# Patient Record
Sex: FEMALE | Race: WHITE
[De-identification: names, ages, dates, MRNs, and addresses within clinical notes are randomized per-mention and may not be internally consistent; named-entity substitution may affect disease eponyms.]

---

## 2017-07-31 ENCOUNTER — HOSPITAL ENCOUNTER (EMERGENCY)
Dept: HOSPITAL 42 - ED | Age: 47
LOS: 1 days | Discharge: HOME | End: 2017-08-01
Payer: MEDICAID

## 2017-07-31 VITALS — TEMPERATURE: 98.6 F | OXYGEN SATURATION: 100 %

## 2017-07-31 VITALS — BODY MASS INDEX: 25.9 KG/M2

## 2017-07-31 DIAGNOSIS — K29.70: ICD-10-CM

## 2017-07-31 DIAGNOSIS — R11.2: ICD-10-CM

## 2017-07-31 DIAGNOSIS — S29.9XXA: Primary | ICD-10-CM
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
## 2017-07-31 DIAGNOSIS — M54.5: ICD-10-CM

## 2017-07-31 PROCEDURE — 80053 COMPREHEN METABOLIC PANEL: CPT

## 2017-07-31 PROCEDURE — 99284 EMERGENCY DEPT VISIT MOD MDM: CPT

## 2017-07-31 PROCEDURE — 96375 TX/PRO/DX INJ NEW DRUG ADDON: CPT

## 2017-07-31 PROCEDURE — 74177 CT ABD & PELVIS W/CONTRAST: CPT

## 2017-07-31 PROCEDURE — 85025 COMPLETE CBC W/AUTO DIFF WBC: CPT

## 2017-07-31 PROCEDURE — 96374 THER/PROPH/DIAG INJ IV PUSH: CPT

## 2017-07-31 PROCEDURE — 72110 X-RAY EXAM L-2 SPINE 4/>VWS: CPT

## 2017-07-31 PROCEDURE — 71260 CT THORAX DX C+: CPT

## 2017-07-31 PROCEDURE — 81001 URINALYSIS AUTO W/SCOPE: CPT

## 2017-07-31 PROCEDURE — 96361 HYDRATE IV INFUSION ADD-ON: CPT

## 2017-07-31 PROCEDURE — 83690 ASSAY OF LIPASE: CPT

## 2017-08-01 VITALS — HEART RATE: 70 BPM | DIASTOLIC BLOOD PRESSURE: 70 MMHG | RESPIRATION RATE: 17 BRPM | SYSTOLIC BLOOD PRESSURE: 137 MMHG

## 2017-08-01 LAB
ADD MANUAL DIFF?: NO
ALBUMIN/GLOB SERPL: 1.3 {RATIO} (ref 1.1–1.8)
ALP SERPL-CCNC: 50 U/L (ref 38–133)
ALT SERPL-CCNC: 29 U/L (ref 7–56)
APPEARANCE UR: CLEAR
AST SERPL-CCNC: 21 U/L (ref 15–39)
BASOPHILS # BLD AUTO: 0.01 K/MM3 (ref 0–2)
BASOPHILS NFR BLD: 0.2 % (ref 0–3)
BILIRUB SERPL-MCNC: 0.2 MG/DL (ref 0.2–1.3)
BILIRUB UR-MCNC: NEGATIVE MG/DL
BUN SERPL-MCNC: 11 MG/DL (ref 7–21)
CALCIUM SERPL-MCNC: 9 MG/DL (ref 8.4–10.5)
CHLORIDE SERPL-SCNC: 103 MMOL/L (ref 95–110)
CO2 SERPL-SCNC: 27 MMOL/L (ref 21–33)
COLOR UR: YELLOW
EOSINOPHIL # BLD: 0.2 10*3/UL (ref 0–0.7)
EOSINOPHIL NFR BLD: 2.6 % (ref 1.5–5)
EPI CELLS #/AREA URNS HPF: (no result) /HPF (ref 0–5)
ERYTHROCYTE [DISTWIDTH] IN BLOOD BY AUTOMATED COUNT: 17.4 % (ref 11.5–14.5)
GLOBULIN SER-MCNC: 3 GM/DL
GLUCOSE SERPL-MCNC: 106 MG/DL (ref 70–110)
GLUCOSE UR STRIP-MCNC: NEGATIVE MG/DL
GRANULOCYTES # BLD: 2.45 10*3/UL (ref 1.4–6.5)
GRANULOCYTES NFR BLD: 42.1 % (ref 50–68)
HCT VFR BLD CALC: 34.5 % (ref 36–48)
KETONES UR STRIP-MCNC: NEGATIVE MG/DL
LEUKOCYTE ESTERASE UR-ACNC: NEGATIVE LEU/UL
LIPASE SERPL-CCNC: 106 U/L (ref 23–300)
LYMPHOCYTES # BLD: 2.8 10*3/UL (ref 1.2–3.4)
LYMPHOCYTES NFR BLD AUTO: 47.7 % (ref 22–35)
MCH RBC QN AUTO: 22.4 PG (ref 25–35)
MCHC RBC AUTO-ENTMCNC: 31.9 G/DL (ref 31–37)
MCV RBC AUTO: 70.4 FL (ref 80–105)
MONOCYTES # BLD AUTO: 0.4 10*3/UL (ref 0.1–0.6)
MONOCYTES NFR BLD: 7.4 % (ref 1–6)
PH UR STRIP: 6.5 [PH] (ref 4.7–8)
PLATELET # BLD: 292 10^3/UL (ref 120–450)
POTASSIUM SERPL-SCNC: 4.1 MMOL/L (ref 3.6–5)
PROT SERPL-MCNC: 7.1 G/DL (ref 5.8–8.3)
PROT UR STRIP-MCNC: NEGATIVE MG/DL
RBC # UR STRIP: (no result) /UL
RBC #/AREA URNS HPF: (no result) /HPF (ref 0–2)
SODIUM SERPL-SCNC: 140 MMOL/L (ref 132–148)
SP GR UR STRIP: 1.02 (ref 1–1.03)
UROBILINOGEN UR STRIP-ACNC: 0.2 E.U./DL
WBC # BLD AUTO: 5.8 10^3/UL (ref 4.5–11)
WBC #/AREA URNS HPF: (no result) /HPF (ref 0–6)

## 2017-08-01 NOTE — CT
EXAM:

  CT Chest With Intravenous Contrast



CLINICAL HISTORY:

  46 years old, female; Pain; Abdominal pain; Generalized; Chest pain; 

Additional info: Abdominal pain/diarrhea/vomiting



TECHNIQUE:

  Axial computed tomography images of the chest with intravenous contrast.  

This CT exam was performed using one or more of the following dose reduction 

techniques:  automated exposure control, adjustment of the mA and/or kV 

according to patient size, and/or use of iterative reconstruction technique.

  Coronal and sagittal reformatted images were created and reviewed.



CONTRAST:

  140 mL of OMNI 350 administered intravenously.



COMPARISON:

  No relevant prior studies available.



FINDINGS:

  Limitations:  Motion artifact - mild.

  Lungs:  Minimal atelectasis.  No consolidation.

  Pleural space:  No pneumothorax.  No significant effusion.

  Heart:  No cardiomegaly.  No significant pericardial effusion.

  Bones/joints:  No acute fracture.

  Soft tissues:  Unremarkable.

  Vasculature:  Unremarkable.  No aneurysm.

  Lymph nodes:  No pathologically enlarged lymph nodes.



IMPRESSION:     

1.No acute findings.

2.Non-acute findings are described above.



_______________________________________________



EXAM:

  CT Abdomen and Pelvis With Intravenous Contrast



CLINICAL HISTORY:

  46 years old, female; Pain; Abdominal pain; Generalized; Chest pain; 

Additional info: Abdominal pain/diarrhea/vomiting



TECHNIQUE:

  Axial computed tomography images of the abdomen and pelvis with intravenous 

contrast.  This CT exam was performed using one or more of the following dose 

reduction techniques:  automated exposure control, adjustment of the mA and/or 

kV according to patient size, and/or use of iterative reconstruction technique.

  Coronal and sagittal reformatted images were created and reviewed.



CONTRAST:

  140 mL of OMNI 350 administered intravenously.



COMPARISON:

  No relevant prior studies available.



FINDINGS:

  Limitations:  Motion artifact - mild.



 ABDOMEN:

  Liver:  Unremarkable.  No mass.

  Gallbladder and bile ducts:  Gallbladder not visualized.  No ductal dilation.

  Pancreas:  No ductal dilation.  No mass.

  Spleen:  Mild splenomegaly, AP dimension.

  Adrenals:  No mass.

  Kidneys and ureters:  No mass. No hydronephrosis.

  Stomach and bowel:  No definite mural thickening.  No obstruction.

  Appendix:  No findings to suggest acute appendicitis.



 PELVIS:

  Bladder:  Unremarkable.

  Reproductive:  IUD.  2.4 x 1.6 x 2.5 cm hypodense lesion within RIGHT ovary.  

1.9 x 1.7 x 1.6 cm hypodense lesion within RIGHT ovary.



 ABDOMEN and PELVIS:

  Intraperitoneal space:  No significant fluid collection.  No free air.

  Bones/joints:  No acute fracture.

  Soft tissues:  Linear scar lower anterior abdominal/pelvic wall.

  Vasculature:  Unremarkable.  No aneurysm.

  Lymph nodes:  No pathologically enlarged lymph nodes.



IMPRESSION:     

1.  Probable RIGHT ovarian cysts.  Consider ultrasound.

2.  Incidental/non-acute findings are described above.

## 2017-08-01 NOTE — RAD
PROCEDURE:  Radiographs of the Lumbar Spine.



HISTORY:

lower back pain







COMPARISON:

No prior.



FINDINGS:



BONES:

Normal curvature appreciated there is a borderline spondylolisthesis 

at L4-5, with L4 appearing marginally anterior to L5. No suspicious 

lytic or blastic change.



DISC SPACES:

Unremarkable.



OTHER FINDINGS:

Iodinated contrast material is identified excreted at the bilateral 

kidneys and ureters as well as in a distended urinary urinary bladder.



IMPRESSION:

Borderline spondylolisthesis L4-5. No definite acute fracture 

appreciated or suspicious lytic or blastic change. Follow-up MRI or 

CT may be performed if clinically warranted.
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
## 2017-11-08 ENCOUNTER — HOSPITAL ENCOUNTER (INPATIENT)
Dept: HOSPITAL 42 - ED | Age: 47
LOS: 6 days | Discharge: HOME | DRG: 426 | End: 2017-11-14
Attending: PSYCHIATRY & NEUROLOGY | Admitting: PSYCHIATRY & NEUROLOGY
Payer: MEDICAID

## 2017-11-08 VITALS — BODY MASS INDEX: 25.9 KG/M2

## 2017-11-08 DIAGNOSIS — G47.00: ICD-10-CM

## 2017-11-08 DIAGNOSIS — G89.29: ICD-10-CM

## 2017-11-08 DIAGNOSIS — F32.9: Primary | ICD-10-CM

## 2017-11-08 DIAGNOSIS — R45.851: ICD-10-CM

## 2017-11-08 DIAGNOSIS — F41.9: ICD-10-CM

## 2017-11-08 DIAGNOSIS — M54.9: ICD-10-CM

## 2017-11-08 LAB
ALBUMIN/GLOB SERPL: 1.3 {RATIO} (ref 1.1–1.8)
ALP SERPL-CCNC: 49 U/L (ref 38–126)
ALT SERPL-CCNC: 45 U/L (ref 7–56)
AMORPH SED URNS QL MICRO: (no result)
APPEARANCE UR: (no result)
AST SERPL-CCNC: 19 U/L (ref 14–36)
BACTERIA #/AREA URNS HPF: (no result) /[HPF]
BASOPHILS # BLD AUTO: 0.01 K/MM3 (ref 0–2)
BASOPHILS NFR BLD: 0.2 % (ref 0–3)
BILIRUB SERPL-MCNC: 0.4 MG/DL (ref 0.2–1.3)
BILIRUB UR-MCNC: NEGATIVE MG/DL
BUN SERPL-MCNC: 19 MG/DL (ref 7–21)
CALCIUM SERPL-MCNC: 9.3 MG/DL (ref 8.4–10.5)
CHLORIDE SERPL-SCNC: 107 MMOL/L (ref 95–110)
CO2 SERPL-SCNC: 32 MMOL/L (ref 21–33)
COLOR UR: YELLOW
EOSINOPHIL # BLD: 0.1 10*3/UL (ref 0–0.7)
EOSINOPHIL NFR BLD: 2.4 % (ref 1.5–5)
ERYTHROCYTE [DISTWIDTH] IN BLOOD BY AUTOMATED COUNT: 17.1 % (ref 11.5–14.5)
GLOBULIN SER-MCNC: 3.4 GM/DL
GLUCOSE SERPL-MCNC: 126 MG/DL (ref 70–110)
GLUCOSE UR STRIP-MCNC: NEGATIVE MG/DL
GRANULOCYTES # BLD: 2.68 10*3/UL (ref 1.4–6.5)
GRANULOCYTES NFR BLD: 54.5 % (ref 50–68)
HCT VFR BLD CALC: 38.1 % (ref 36–48)
KETONES UR STRIP-MCNC: NEGATIVE MG/DL
LEUKOCYTE ESTERASE UR-ACNC: NEGATIVE LEU/UL
LYMPHOCYTES # BLD: 1.8 10*3/UL (ref 1.2–3.4)
LYMPHOCYTES NFR BLD AUTO: 37.2 % (ref 22–35)
MCH RBC QN AUTO: 23.7 PG (ref 25–35)
MCHC RBC AUTO-ENTMCNC: 32.3 G/DL (ref 31–37)
MCV RBC AUTO: 73.6 FL (ref 80–105)
MONOCYTES # BLD AUTO: 0.3 10*3/UL (ref 0.1–0.6)
MONOCYTES NFR BLD: 5.7 % (ref 1–6)
PH UR STRIP: 7.5 [PH] (ref 4.7–8)
PLATELET # BLD: 259 10^3/UL (ref 120–450)
POTASSIUM SERPL-SCNC: 4.2 MMOL/L (ref 3.6–5)
PROT SERPL-MCNC: 7.7 G/DL (ref 5.8–8.3)
PROT UR STRIP-MCNC: NEGATIVE MG/DL
RBC # UR STRIP: (no result) /UL
SODIUM SERPL-SCNC: 145 MMOL/L (ref 132–148)
SP GR UR STRIP: 1.01 (ref 1–1.03)
UROBILINOGEN UR STRIP-ACNC: 0.2 E.U./DL
WBC # BLD AUTO: 4.9 10^3/UL (ref 4.5–11)

## 2017-11-08 NOTE — CARD
--------------- APPROVED REPORT --------------





EKG Measurement

Heart [id number]

OR [id number]

[id number]

[id number]

[id number]



<Conclusion>

Normal sinus rhythm with sinus arrhythmia

Normal ECG

## 2017-11-08 NOTE — ED PDOC
Arrival/HPI





- General


Chief Complaint: Psychiatric Evaluation


Time Seen by Provider: 11/08/17 18:21


Historian: Patient





- History of Present Illness


Narrative History of Present Illness (Text): 





11/08/17 20:35


46-year-old female presents today sent in by her primary care physician for 

depression. Patient is stating that she wants to die and she has no reason to 

live. Patient denies chest pain or shortness of breath. Denies abdominal pain. 

Denies dizziness or weakness. Complaining of suicidal ideations. Patient states 

she has no reason to live because her  and her son left her.





Past Medical History





- Provider Review


Nursing Documentation Reviewed: Yes





- Travel History


Have you recently traveled outside [place] w/in the past 3 mons?: No





- Infectious Disease


Hx of Infectious Diseases: None





- Past Medical History


Past Medical History: No Previous





- Cardiac


Hx Hypertension: Yes





- Hematological/Oncological


Hx Anemia: Yes





- Psychiatric


Hx Depression: No


Hx Substance Use: No





- Past Surgical History


Past Surgical History: No Previous





- Surgical History


Hx Cholecystectomy: Yes





- Anesthesia


Hx Anesthesia: Yes


Hx Anesthesia Reactions: No





- Suicidal Assessment


Suicide Risk Precautions: None





Family/Social History





- Physician Review


Nursing Documentation Reviewed: Yes


Family/Social History: Unknown Family HX


Smoking Status: Never Smoked


Hx Alcohol Use: No


Hx Substance Use: No


Hx Substance Use Treatment: No





Allergies/Home Meds


Allergies/Adverse Reactions: 


Allergies





No Known Allergies Allergy (Verified 11/08/17 18:13)


 








Home Medications: 


 Home Meds











 Medication  Instructions  Recorded  Confirmed


 


No Known Home Med  11/08/17 11/08/17














Review of Systems





- Review of Systems


Constitutional: absent: Fatigue, Fevers


Respiratory: absent: SOB, Cough


Cardiovascular: absent: Chest Pain, Palpitations


Gastrointestinal: absent: Abdominal Pain, Nausea, Vomiting


Genitourinary Female: absent: Dysuria


Musculoskeletal: absent: Arthralgias


Skin: absent: Rash, Pruritis


Psychiatric: Depression, Suicidal Ideation





Physical Exam


Vital Signs Reviewed: Yes


Vital Signs











  Temp Pulse Resp BP Pulse Ox


 


 11/08/17 19:00   77  17  134/68  98


 


 11/08/17 18:21  99.7 F H  80  20  157/105 H  98











Temperature: Afebrile


Blood Pressure: Hypertensive


Pulse: Regular


Respiratory Rate: Normal


Appearance: Positive for: Well-Appearing, Non-Toxic, Comfortable


Pain Distress: None


Mental Status: Positive for: Alert and Oriented X 3





- Systems Exam


Head: Present: Atraumatic


Mouth: Present: Moist Mucous Membranes


Respiratory/Chest: Present: Clear to Auscultation


Cardiovascular: Present: Regular Rate and Rhythm


Abdomen: No: Tenderness


Neurological: Present: GCS=15


Skin: Present: Warm, Dry, Normal Color.  No: Rashes


Psychiatric: Present: Alert, Oriented x 3





Medical Decision Making


ED Course and Treatment: 





11/08/17 20:36


Patient is nontoxic well-appearing in no distress vital signs are stable.





CBC WNL


CMP WNL





Tylenol WNL


Salicylate WNL


Alcohol level WNL





Urine drug screen wnl





UA; + blood





cxr: wnl





ekg normal sinus rhythm with sinus arrhythmia at 78beats per minute no ST 

elevations normal axis








pt is medically cleared for PES evaluation


Patient was seen and evaluated by PES screener:  erik








patient signed voluntarily to  psychiatric floor





Impression;Depression


admit to behavioral health floor








- Lab Interpretations


Lab Results: 








 11/08/17 18:35 





 11/08/17 18:35 





 Lab Results





11/08/17 19:15: Urine Opiates Screen Negative, Urine Methadone Screen Negative, 

Ur Barbiturates Screen Negative, Ur Phencyclidine Scrn Negative, Ur 

Amphetamines Screen Negative, U Benzodiazepines Scrn Negative, U Oth Cocaine 

Metabols Negative, U Cannabinoids Screen Negative


11/08/17 19:15: Urine Color Yellow, Urine Appearance Sl cloudy, Urine pH 7.5, 

Ur Specific Gravity 1.010, Urine Protein Negative, Urine Glucose (UA) Negative, 

Urine Ketones Negative, Urine Blood Moderate H, Urine Nitrate Negative, Urine 

Bilirubin Negative, Urine Urobilinogen 0.2, Ur Leukocyte Esterase Negative, 

Urine RBC 2 - 5, Urine WBC 2 - 5, Ur Epithelial Cells 3 - 4, Amorphous Sediment 

Moderate, Urine Bacteria Mod


11/08/17 18:35: Alcohol, Quantitative < 10


11/08/17 18:35: Salicylates < 1 L, Acetaminophen < 10.0 L


11/08/17 18:35: Sodium 145, Potassium 4.2, Chloride 107, Carbon Dioxide 32, 

Anion Gap 10, BUN 19, Creatinine 0.8, Est GFR (African Amer) > 60, Est GFR (Non-

Af Amer) > 60, Random Glucose 126 H, Calcium 9.3, Total Bilirubin 0.4, AST 19, 

ALT 45, Alkaline Phosphatase 49, Total Protein 7.7, Albumin 4.3, Globulin 3.4, 

Albumin/Globulin Ratio 1.3


11/08/17 18:35: WBC 4.9, RBC 5.18, Hgb 12.3, Hct 38.1, MCV 73.6 L, MCH 23.7 L, 

MCHC 32.3, RDW 17.1 H, Plt Count 259, Gran % 54.5, Lymph % (Auto) 37.2 H, Mono 

% (Auto) 5.7, Eos % (Auto) 2.4, Baso % (Auto) 0.2, Gran # 2.68, Lymph # 1.8, 

Mono # 0.3, Eos # 0.1, Baso # 0.01











- RAD Interpretation


Radiology Orders: 








11/08/17 18:23


CHEST PORTABLE [RAD] Stat 














- Medication Orders


Current Medication Orders: 








Acetaminophen (Tylenol 325mg Tab)  650 mg PO Q4H PRN


   PRN Reason: Pain, Mild (1-3)


Al Hydrox/Mg Hydrox/Simethicone (Maalox Plus 30 Ml)  30 ml PO DAILY PRN


   PRN Reason: Upset Stomach


Citalopram Hydrobromide (Celexa)  10 mg PO DAILY ARIELLA


Lorazepam (Ativan)  1 mg PO HS ARIELLA


   PRN Reason: Protocol


Magnesium Hydroxide (Milk Of Magnesia)  30 ml PO DAILY PRN


   PRN Reason: Constipation


Zaleplon (Sonata)  5 mg PO HS PRN


   PRN Reason: Insomnia











Disposition/Present on Arrival





- Present on Arrival


Any Indicators Present on Arrival: No


History of DVT/PE: No


History of Uncontrolled Diabetes: No


Urinary Catheter: No


History of Decub. Ulcer: No


History Surgical Site Infection Following: None





- Disposition


Have Diagnosis and Disposition been Completed?: Yes


Diagnosis: 


 Depression





Disposition: HOSPITALIZED


Disposition Time: 21:00


Patient Plan: Admission


Patient Problems: 


 Current Active Problems











Problem Status Onset


 


Depression Acute  











Condition: FAIR

## 2017-11-09 LAB — CHOLEST SERPL-MCNC: 168 MG/DL (ref 130–200)

## 2017-11-09 PROCEDURE — GZ3ZZZZ MEDICATION MANAGEMENT: ICD-10-PCS | Performed by: PSYCHIATRY & NEUROLOGY

## 2017-11-09 NOTE — RAD
HISTORY:

PEs  



COMPARISON:

No prior. 



FINDINGS:



LUNGS:

No active pulmonary disease.



PLEURA:

No significant pleural effusion identified, no pneumothorax apparent.



CARDIOVASCULAR:

Normal.



OSSEOUS STRUCTURES:

No significant abnormalities.



VISUALIZED UPPER ABDOMEN:

Normal.



OTHER FINDINGS:

None.



IMPRESSION:

No active disease.

## 2017-11-09 NOTE — PCM.BM
<Samantha Melendez - Last Filed: 11/09/17 00:03>





Treatment Plan Problems





- Problems identified on initial assessmt


  ** DEPRESSION


Date Initiated: 11/08/17


Time Initiated: 23:00


Assessment reference: NA


Status: Active





  ** SUICIDAL IDEATION


Date Initiated: 11/08/17


Time Initiated: 23:00


Assessment reference: NA


Status: Active





Treatment assets and liabiliti


Patient Assests: adapts well, cooperative, educated, self-reliant, ADL 

independent, negotiates basic needs, cognitively intact


Patient Liabilities: live alone, financial problems, medical problems





- Milieu Protocol


Maintain good personal hygiene: daily Encourage regular showers, every shift 

Remind patient to perform daily oral care, every shift Assist patient to 

perform ADL's


Maintain personal safety: every shift Educate patient to report safety concerns 

to staff, every shift Monitor environment for contraband/sharps


Medication safety: Monitor for expected outcome, potential side effects: every 

shift, Assess barriers to learning: every shift, Assess readiness for 

medication education: every shift





Family Contact


Family involvement: Famliy/SO not involved ( AND 2 SONS DOESN'T CARE 

FOR HER)


Family contact: Patient agrees to contact





Discharge/Continuing Care





- Education Needs


Education Needs: Patient Medication, Patient Diagnosis/Disease Process, Patient 

Coping Skills, Patient Placement options, Patient Community resources, Patient 

Activities of Daily Living, Patient Health Practices/Safety





- Discharge


Discharge Criteria: Tolerates medication w/o severe side effects, Free of 

Suicidal thoughts, Free of paranoid thoughts, Normal sleep pattern





<Mike Bean - Last Filed: 11/15/17 16:39>





- Diagnosis


(1) Depression


Status: Acute   


Interventions: 





11/15/17 16:39


* Assess/adjust medications daily and /or as needed


* Discuss risks, benefits, side effects and alternatives of medications


* See patient on an individual basis 7x/week to assess level of suicidal 

thoughts


*

## 2017-11-10 NOTE — PN
SUBJECTIVE:  The patient is a 46-year-old  or  Slovak

female who had been depressed.



She appears to be still melodramatic, lacking in insight, but less

histrionic than yesterday.  This was taken to me that her mood is improving

somewhat.



She is being maintained on Ativan 1 mg at bedtime, Celexa 10 mg per day,

Effexor 37.5 mg per day, Risperdal 0.25 mg at bedtime.



OBJECTIVE:  Blood pressure 94/45, pulse 62, temperature 97, respiratory

rate 20.



ASSESSMENT AND PLAN:  I have tried to convey to the patient that if she is

able to maintain a state of mood stability and behavioral control over the

weekend, she will be a candidate for discharge on Monday.







__________________________________________

Mike Baen MD/ PhD



DD:  11/10/2017 15:20:12

DT:  11/10/2017 18:05:19

Job # [id number]
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
## 2017-11-11 RX ADMIN — PANTOPRAZOLE SODIUM SCH MG: 40 TABLET, DELAYED RELEASE ORAL at 09:59

## 2017-11-11 NOTE — PCM.PYCHPN
Psychiatric Progress Note





- Psychiatric Progress Note


Patient seen today, length of contact: 25 min


Patient Chief Complaint: 


"feeling good"


Problems Identified/Issues Discussed: 


I recent notes and met with patient at bedside in the quiet room. Patient is 

fairly cooperative. I ask her questions about her orientation she is unaware of 

which month it is, states "I forget". However she is able to tell me that it is 

2017. Patient is guarded and her affect is constricted though she tells me that 

she is feeling good. Reports that her medications are "OK". She denies any side 

effects. Indicate that she didn't go to sleep until 2 AM. Patient denies any 

suicidal thoughts and feels "safe on the unit".


Staff notes indicate the patient seems brighter, more appropriate and pleasant. 

Seems anxious but cooperative. There are no behavioral issues overnight.





Diagnostic Results: 


Major Depressive Disorder, Severe


Medication Change: No


Medical Record Reviewed: Yes





Mental Status Examination





- Cognitive Function


Orientation: Person, Place


Memory: Impaired


Attention: Poor


Concentration: Poor





- Mood


Mood: Other (feeling good)





- Affect


Affect: Constricted





- Speech


Speech: Soft





- Suicidal Ideation


Suicidal Ideation: No





- Homicidal Ideation


Homicidal Ideation: No





Goal/Treatment Plan





- Goal/Treatment Plan


Progress Toward Problem(s) and Goals/Treatment Plan: 





* c/w current tx and plan


* Vitals reviewed and noted below:


 Selected Entries











  11/08/17 11/09/17 11/09/17





  23:40 07:18 16:00


 


Temperature 98.1 F 97.8 F 


 


Pulse Rate 71 20 L 62


 


Respiratory 20 20 





Rate   


 


Blood Pressure 146/69 97/45 L 94/45 L

## 2017-11-12 LAB — TRANSFERRIN SERPL-MCNC: 230.4 MG/DL (ref 206–381)

## 2017-11-12 RX ADMIN — PANTOPRAZOLE SODIUM SCH MG: 40 TABLET, DELAYED RELEASE ORAL at 08:28

## 2017-11-12 NOTE — PCM.PYCHPN
Psychiatric Progress Note





- Psychiatric Progress Note


Patient seen today, length of contact: 25 min


Patient Chief Complaint: 


"a little better"


Problems Identified/Issues Discussed: 


 I recent notes and met with patient at bedside. Patient is fairly cooperative 

however she remains guarded. She reports that she she feeling a little better. 

Her affect is constricted. Feels medications make her tired, denies other side 

effects, new discomfort or pain. Patient denies any suicidal thoughts. 


Staff notes indicate the patient has been labile and argumentative at times. 

She attends group but minimally participates. Seems anxious. There were no 

behavioral issues overnight. 














Diagnostic Results: 


Major Depressive Disorder, Severe


Medication Change: No


Medical Record Reviewed: Yes





Mental Status Examination





- Cognitive Function


Orientation: Person, Place


Memory: Impaired


Attention: Poor


Concentration: Poor





- Mood


Mood: Other ("a little better")





- Affect


Affect: Constricted





- Speech


Speech: Soft





- Suicidal Ideation


Suicidal Ideation: No





- Homicidal Ideation


Homicidal Ideation: No





Goal/Treatment Plan





- Goal/Treatment Plan


Progress Toward Problem(s) and Goals/Treatment Plan: 





* c/w current tx and plan


* No new weekend labs


* Vitals reviewed and noted below:


 Selected Entries











  11/11/17 11/11/17





  06:53 16:00


 


Temperature 98.4 F 


 


Pulse Rate 74 73


 


Respiratory 17 





Rate  


 


Blood Pressure 125/65 96/52 L


 


O2 Sat by Pulse 99 





Oximetry

## 2017-11-13 RX ADMIN — PANTOPRAZOLE SODIUM SCH MG: 40 TABLET, DELAYED RELEASE ORAL at 14:34
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
## 2017-11-14 VITALS
RESPIRATION RATE: 19 BRPM | HEART RATE: 73 BPM | SYSTOLIC BLOOD PRESSURE: 116 MMHG | DIASTOLIC BLOOD PRESSURE: 63 MMHG | TEMPERATURE: 98.3 F | OXYGEN SATURATION: 100 %

## 2017-11-14 RX ADMIN — PANTOPRAZOLE SODIUM SCH MG: 40 TABLET, DELAYED RELEASE ORAL at 08:42

## 2017-11-14 NOTE — PN
DATE:



SUBJECTIVE:  The patient is a 46-year-old distraught,  or 

Lithuanian female who had been depressed and hyperreactive.



The patient's mood and affect are improving.



She is distraught over the separation of her children (teenagers), (who

themselves had history of problematic behaviors), who have been removed

from her by [facility] and given to custody of her / ).



Her mood and affect appear to be improving.  She appears to be less

melodramatic, more spontaneous and more interactive.



She is not suicidal or homicidal.



She is being maintained on Ativan 1 mg at bedtime, Effexor 37.5 mg daily,

Risperdal 0.25 mg at bedtime and Sonata 5 mg p.r.n.



PHYSICAL EXAMINATION:

VITAL SIGNS:  Blood pressure 118/60, pulse 74, temperature 98.2 and

respiratory rate 20.



The patient has been concerned about the discontinuation of her food

stamps.







__________________________________________

Mike Bean MD/ PhD



DD:  11/13/2017 20:31:55

DT:  11/13/2017 21:07:28

Job # [id number]

## 2017-11-14 NOTE — PN
DATE:  11/13/2017



SUBJECTIVE:  A 46-year-old female.  The patient seems better, seems less

depressed, comfortable.  No physical complaints other than her back problem

and she wants to go home.  According to the patient she feel better.  No

other complaints.  No depression signs.  At this moment, she feels better

than the day 1 she came in.



PHYSICAL EXAMINATION:

VITAL SIGNS:  Temperature 97.4, heart rate 74, blood pressure 112/65,

respirations 20, and saturation 97%.

HEAD AND NECK:  Normal.  No JVD.  No thyromegaly.

CHEST:  Clear good air entry.

CARDIAC:  First sound and second sound normal.

ABDOMEN:  Soft and nontender.

EXTREMITIES:  No edema.

NEUROLOGIC:  Normal.



IMPRESSION AND PLAN:

1.  Depression, possible bipolar disorder.  Continue therapy as per

psychiatrist.

2.  Chronic back pain.  Continue Protonix, Zanaflex, physical therapy,

ibuprofen p.r.n., and we will follow up clinically.



The patient's current medications is follows:  She is getting as per

psychiatrist Ativan 1 mg at bedtime and 1 mg every 4 hours p.r.n.  She is

getting Effexor 37.5 mg p.o. daily.  She is getting also risperidone 0.25

mg p.o. at bedtime.  Also sonata 5 mg at bedtime.  Other medications that

the patient takes is Bactroban, Colace, Maalox, milk of magnesia, Motrin,

Protonix, Tylenol, and Zanaflex.  Continue current therapy.  Followup

clinically.





__________________________________________

Jhony Delvalle MD





DD:  11/14/2017 9:07:25

DT:  11/14/2017 10:10:28

Job # [id number]

## 2018-07-02 ENCOUNTER — HOSPITAL ENCOUNTER (EMERGENCY)
Dept: HOSPITAL 42 - ED | Age: 48
Discharge: HOME | End: 2018-07-02
Payer: COMMERCIAL

## 2018-07-02 VITALS — TEMPERATURE: 98.2 F | DIASTOLIC BLOOD PRESSURE: 69 MMHG | RESPIRATION RATE: 18 BRPM | SYSTOLIC BLOOD PRESSURE: 123 MMHG

## 2018-07-02 VITALS — BODY MASS INDEX: 25.9 KG/M2

## 2018-07-02 VITALS — HEART RATE: 72 BPM | OXYGEN SATURATION: 99 %
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
## 2018-07-02 DIAGNOSIS — S13.4XXA: Primary | ICD-10-CM

## 2018-07-02 DIAGNOSIS — S16.1XXA: ICD-10-CM
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
